# Patient Record
Sex: FEMALE | Race: WHITE | NOT HISPANIC OR LATINO | Employment: FULL TIME | URBAN - METROPOLITAN AREA
[De-identification: names, ages, dates, MRNs, and addresses within clinical notes are randomized per-mention and may not be internally consistent; named-entity substitution may affect disease eponyms.]

---

## 2024-12-02 ENCOUNTER — APPOINTMENT (OUTPATIENT)
Dept: OTOLARYNGOLOGY | Facility: CLINIC | Age: 39
End: 2024-12-02
Payer: COMMERCIAL

## 2024-12-02 VITALS
SYSTOLIC BLOOD PRESSURE: 132 MMHG | BODY MASS INDEX: 38.41 KG/M2 | HEIGHT: 64 IN | WEIGHT: 225 LBS | DIASTOLIC BLOOD PRESSURE: 83 MMHG

## 2024-12-02 DIAGNOSIS — J32.9 CHRONIC SINUSITIS, UNSPECIFIED LOCATION: Primary | ICD-10-CM

## 2024-12-02 PROCEDURE — 1036F TOBACCO NON-USER: CPT | Performed by: STUDENT IN AN ORGANIZED HEALTH CARE EDUCATION/TRAINING PROGRAM

## 2024-12-02 PROCEDURE — 3008F BODY MASS INDEX DOCD: CPT | Performed by: STUDENT IN AN ORGANIZED HEALTH CARE EDUCATION/TRAINING PROGRAM

## 2024-12-02 PROCEDURE — 31231 NASAL ENDOSCOPY DX: CPT | Performed by: STUDENT IN AN ORGANIZED HEALTH CARE EDUCATION/TRAINING PROGRAM

## 2024-12-02 PROCEDURE — 99204 OFFICE O/P NEW MOD 45 MIN: CPT | Performed by: STUDENT IN AN ORGANIZED HEALTH CARE EDUCATION/TRAINING PROGRAM

## 2024-12-02 RX ORDER — PSEUDOEPHEDRINE HCL 30 MG
1 TABLET ORAL
COMMUNITY
Start: 2024-01-16

## 2024-12-02 RX ORDER — METHYLPHENIDATE HYDROCHLORIDE EXTENDED RELEASE 20 MG/1
TABLET ORAL
COMMUNITY
Start: 2024-10-06

## 2024-12-02 RX ORDER — METHYLPREDNISOLONE 4 MG/1
TABLET ORAL
Qty: 21 TABLET | Refills: 0 | Status: SHIPPED | OUTPATIENT
Start: 2024-12-02 | End: 2024-12-08

## 2024-12-02 RX ORDER — ALBUTEROL SULFATE 90 UG/1
INHALANT RESPIRATORY (INHALATION)
COMMUNITY
Start: 2024-05-22

## 2024-12-02 NOTE — PROGRESS NOTES
ENT Outpatient Consultation    Chief Complaint: recurrent sinusitis  History Of Present Illness  Archana Ruano is a 38 y.o. female presents for evaluation of frequent sinus infections. She has had about 7 sinus infections in the last year, prior to that it was only a few a year. No prior sinus surgery. Recently moved from [place]. Just completed a course of doxycycline a few days ago for sinusitis but she feels like her symptoms are starting to return. She has been allergy tested, reports only mild allergies to dog, cat. Previously was on azelastine but this cause nose bleeds. She denies any change in allergy symptoms since moving from [place] to [place].  She has been on flonase for many years and uses saline irrigations a few times per week. During infections, she describes facial pressure under her eyes and along her forehead. Also had thick nasal drainage. She has asthma and when she gets sick she tends to have poorer asthma control. No difficulty taking NSAIDs or aspirin. Also endorses bothersome ear fullness.     Past Medical History  She has no past medical history on file.    Surgical History  She has no past surgical history on file.     Social History  She reports that she has never smoked. She has never used smokeless tobacco. No history on file for alcohol use and drug use.    Family History  No family history on file.     Allergies  Patient has no known allergies.     Physical Exam:  CONSTITUTIONAL:  No acute distress  VOICE:  No hoarseness or other abnormality  RESPIRATION:  Breathing comfortably, no stridor  EYES:  EOM intact, sclera normal  NEURO:  Alert and oriented times 3, Cranial nerves II-XII grossly intact and symmetric bilaterally  HEAD AND FACE:  Symmetric facial features, no masses or lesions, sinuses non-tender to palpation  EARS:  Normal external ears, external auditory canals, and TMs to otoscopy  NOSE:  External nose midline, anterior rhinoscopy shows right>left turbinate hypertrophy,  "mild septal deviation to the left  ORAL CAVITY/OROPHARYNX/LIPS:  Normal mucous membranes, normal floor of mouth/tongue/OP, no masses or lesions  PHARYNGEAL WALLS:  No masses or lesions  NECK/LYMPH:  No LAD, no thyroid masses, trachea midline  SKIN:  Neck skin is without scar or injury  PSYCH:  Alert and oriented with appropriate mood and affect    Procedure:  Rigid nasal endoscopy ([id number])  Pre-procedure diagnosis/Indication for procedure:  To evaluate areas not visualized on anterior rhinoscopy   Anesthesia:  4% mucosal lidocaine  Description:  A 0-degree 3-mm rigid nasal endoscope was used to examine the left and right nasal cavities.  The nasal valve areas were examined for abnormalities or collapse.  The inferior and middle turbinates were evaluated.  The middle and superior meatuses, and the sphenoethmoid recesses were examined and inspected for mucopurulence and polyps. Once the endoscope was withdrawn, the patient was noted to have tolerated the procedure well without complications and was returned to ambulatory status.    Findings:  - narrowed left side nasal passage  - Mucosal edema throughout the nasal passages. Severe edema of the left middle meatus without purulence or polyps  - Moderate inferior turbinate hypertrophy, right > left     Last Recorded Vitals  Blood pressure 132/83, height 1.613 m (5' 3.5\"), weight 102 kg (225 lb).    Relevant Results  No results found.    Assessment and Plan  38 y.o. female with symptoms consistent with chronic rhinosinusitis with frequent exacerbations of symptoms. She typically is placed on antibiotics, most recently completing a course about 1 week ago, but has not been on a course of steroids. Endoscopy today shows moderate-severe edema of the middle meatus bilaterally without polyps or purulence.     -Switch from flonase to budesonide irrigations (order faxed to compounding pharmacy today)  -Medrol dose pack  -CT sinus after completion of steroids  -RTC 6-8 weeks with " audiogram    Problem List Items Addressed This Visit    None  Visit Diagnoses       Chronic sinusitis, unspecified location    -  Primary    Relevant Medications    methylPREDNISolone (Medrol Dospak) 4 mg tablets    Other Relevant Orders    CT sinuss wo IV contrast volumetric surgical planning            Lucita Riley MD

## 2024-12-17 ENCOUNTER — HOSPITAL ENCOUNTER (OUTPATIENT)
Dept: RADIOLOGY | Facility: CLINIC | Age: 39
Discharge: HOME | End: 2024-12-17
Payer: COMMERCIAL

## 2024-12-17 DIAGNOSIS — J32.9 CHRONIC SINUSITIS, UNSPECIFIED LOCATION: ICD-10-CM

## 2024-12-17 PROCEDURE — 70486 CT MAXILLOFACIAL W/O DYE: CPT

## 2025-01-13 ENCOUNTER — APPOINTMENT (OUTPATIENT)
Dept: OTOLARYNGOLOGY | Facility: CLINIC | Age: 40
End: 2025-01-13
Payer: COMMERCIAL

## 2025-08-08 ENCOUNTER — APPOINTMENT (OUTPATIENT)
Dept: OBSTETRICS AND GYNECOLOGY | Facility: CLINIC | Age: 40
End: 2025-08-08
Payer: COMMERCIAL